# Patient Record
Sex: MALE | Race: BLACK OR AFRICAN AMERICAN | URBAN - METROPOLITAN AREA
[De-identification: names, ages, dates, MRNs, and addresses within clinical notes are randomized per-mention and may not be internally consistent; named-entity substitution may affect disease eponyms.]

---

## 2019-11-19 ENCOUNTER — TELEPHONE (OUTPATIENT)
Dept: INTERNAL MEDICINE CLINIC | Age: 34
End: 2019-11-19

## 2023-02-21 ENCOUNTER — APPOINTMENT (OUTPATIENT)
Dept: ULTRASOUND IMAGING | Age: 38
End: 2023-02-21
Attending: STUDENT IN AN ORGANIZED HEALTH CARE EDUCATION/TRAINING PROGRAM
Payer: MEDICAID

## 2023-02-21 ENCOUNTER — APPOINTMENT (OUTPATIENT)
Dept: CT IMAGING | Age: 38
End: 2023-02-21
Attending: STUDENT IN AN ORGANIZED HEALTH CARE EDUCATION/TRAINING PROGRAM
Payer: MEDICAID

## 2023-02-21 ENCOUNTER — HOSPITAL ENCOUNTER (EMERGENCY)
Age: 38
Discharge: HOME | End: 2023-02-21
Attending: STUDENT IN AN ORGANIZED HEALTH CARE EDUCATION/TRAINING PROGRAM
Payer: MEDICAID

## 2023-02-21 VITALS
HEIGHT: 69 IN | OXYGEN SATURATION: 97 % | HEART RATE: 102 BPM | DIASTOLIC BLOOD PRESSURE: 118 MMHG | TEMPERATURE: 98.1 F | SYSTOLIC BLOOD PRESSURE: 160 MMHG | BODY MASS INDEX: 45.32 KG/M2 | WEIGHT: 306 LBS | RESPIRATION RATE: 18 BRPM

## 2023-02-21 DIAGNOSIS — K59.00 CONSTIPATION, UNSPECIFIED CONSTIPATION TYPE: ICD-10-CM

## 2023-02-21 DIAGNOSIS — R10.11 ABDOMINAL PAIN, RIGHT UPPER QUADRANT: Primary | ICD-10-CM

## 2023-02-21 LAB
ALBUMIN SERPL-MCNC: 2.9 G/DL (ref 3.5–5)
ALBUMIN/GLOB SERPL: 0.7 (ref 1.1–2.2)
ALP SERPL-CCNC: 86 U/L (ref 45–117)
ALT SERPL-CCNC: 19 U/L (ref 12–78)
ANION GAP SERPL CALC-SCNC: 10 MMOL/L (ref 5–15)
AST SERPL-CCNC: 20 U/L (ref 15–37)
BASOPHILS # BLD: 0.1 K/UL (ref 0–0.1)
BASOPHILS NFR BLD: 1 % (ref 0–1)
BILIRUB SERPL-MCNC: 0.4 MG/DL (ref 0.2–1)
BUN SERPL-MCNC: 24 MG/DL (ref 6–20)
BUN/CREAT SERPL: 10 (ref 12–20)
CALCIUM SERPL-MCNC: 9 MG/DL (ref 8.5–10.1)
CHLORIDE SERPL-SCNC: 101 MMOL/L (ref 97–108)
CO2 SERPL-SCNC: 26 MMOL/L (ref 21–32)
CREAT SERPL-MCNC: 2.4 MG/DL (ref 0.7–1.3)
DIFFERENTIAL METHOD BLD: NORMAL
EOSINOPHIL # BLD: 0.3 K/UL (ref 0–0.4)
EOSINOPHIL NFR BLD: 3 % (ref 0–7)
ERYTHROCYTE [DISTWIDTH] IN BLOOD BY AUTOMATED COUNT: 12.6 % (ref 11.5–14.5)
GLOBULIN SER CALC-MCNC: 4.3 G/DL (ref 2–4)
GLUCOSE SERPL-MCNC: 181 MG/DL (ref 65–100)
HCT VFR BLD AUTO: 39.7 % (ref 36.6–50.3)
HGB BLD-MCNC: 13.6 G/DL (ref 12.1–17)
IMM GRANULOCYTES # BLD AUTO: 0 K/UL (ref 0–0.04)
IMM GRANULOCYTES NFR BLD AUTO: 0 % (ref 0–0.5)
INR PPP: 1 (ref 0.9–1.1)
LIPASE SERPL-CCNC: 115 U/L (ref 73–393)
LYMPHOCYTES # BLD: 2.4 K/UL (ref 0.8–3.5)
LYMPHOCYTES NFR BLD: 31 % (ref 12–49)
MCH RBC QN AUTO: 29.8 PG (ref 26–34)
MCHC RBC AUTO-ENTMCNC: 34.3 G/DL (ref 30–36.5)
MCV RBC AUTO: 86.9 FL (ref 80–99)
MONOCYTES # BLD: 0.6 K/UL (ref 0–1)
MONOCYTES NFR BLD: 8 % (ref 5–13)
NEUTS SEG # BLD: 4.5 K/UL (ref 1.8–8)
NEUTS SEG NFR BLD: 57 % (ref 32–75)
NRBC # BLD: 0 K/UL (ref 0–0.01)
NRBC BLD-RTO: 0 PER 100 WBC
PLATELET # BLD AUTO: 309 K/UL (ref 150–400)
PMV BLD AUTO: 11.1 FL (ref 8.9–12.9)
POTASSIUM SERPL-SCNC: 4.4 MMOL/L (ref 3.5–5.1)
PROT SERPL-MCNC: 7.2 G/DL (ref 6.4–8.2)
PROTHROMBIN TIME: 9.9 SEC (ref 9–11.1)
RBC # BLD AUTO: 4.57 M/UL (ref 4.1–5.7)
SODIUM SERPL-SCNC: 137 MMOL/L (ref 136–145)
TROPONIN I SERPL HS-MCNC: 26 NG/L (ref 0–76)
WBC # BLD AUTO: 7.8 K/UL (ref 4.1–11.1)

## 2023-02-21 PROCEDURE — 74011250636 HC RX REV CODE- 250/636: Performed by: STUDENT IN AN ORGANIZED HEALTH CARE EDUCATION/TRAINING PROGRAM

## 2023-02-21 PROCEDURE — 93005 ELECTROCARDIOGRAM TRACING: CPT

## 2023-02-21 PROCEDURE — 84484 ASSAY OF TROPONIN QUANT: CPT

## 2023-02-21 PROCEDURE — 85610 PROTHROMBIN TIME: CPT

## 2023-02-21 PROCEDURE — 76705 ECHO EXAM OF ABDOMEN: CPT

## 2023-02-21 PROCEDURE — 74011250637 HC RX REV CODE- 250/637: Performed by: STUDENT IN AN ORGANIZED HEALTH CARE EDUCATION/TRAINING PROGRAM

## 2023-02-21 PROCEDURE — 85025 COMPLETE CBC W/AUTO DIFF WBC: CPT

## 2023-02-21 PROCEDURE — 83690 ASSAY OF LIPASE: CPT

## 2023-02-21 PROCEDURE — 74176 CT ABD & PELVIS W/O CONTRAST: CPT

## 2023-02-21 PROCEDURE — 96361 HYDRATE IV INFUSION ADD-ON: CPT

## 2023-02-21 PROCEDURE — 36415 COLL VENOUS BLD VENIPUNCTURE: CPT

## 2023-02-21 PROCEDURE — 96374 THER/PROPH/DIAG INJ IV PUSH: CPT

## 2023-02-21 PROCEDURE — 99284 EMERGENCY DEPT VISIT MOD MDM: CPT

## 2023-02-21 PROCEDURE — 80053 COMPREHEN METABOLIC PANEL: CPT

## 2023-02-21 RX ORDER — ONDANSETRON 4 MG/1
4 TABLET, FILM COATED ORAL
Qty: 20 TABLET | Refills: 0 | Status: SHIPPED | OUTPATIENT
Start: 2023-02-21

## 2023-02-21 RX ORDER — POLYETHYLENE GLYCOL 3350 17 G/17G
17 POWDER, FOR SOLUTION ORAL DAILY
Qty: 116 G | Refills: 0 | Status: SHIPPED | OUTPATIENT
Start: 2023-02-21 | End: 2023-02-28

## 2023-02-21 RX ORDER — ONDANSETRON 2 MG/ML
4 INJECTION INTRAMUSCULAR; INTRAVENOUS
Status: COMPLETED | OUTPATIENT
Start: 2023-02-21 | End: 2023-02-21

## 2023-02-21 RX ORDER — ACETAMINOPHEN 325 MG/1
975 TABLET ORAL
Status: COMPLETED | OUTPATIENT
Start: 2023-02-21 | End: 2023-02-21

## 2023-02-21 RX ADMIN — SODIUM CHLORIDE 1000 ML: 9 INJECTION, SOLUTION INTRAVENOUS at 16:23

## 2023-02-21 RX ADMIN — ACETAMINOPHEN 975 MG: 325 TABLET ORAL at 16:39

## 2023-02-21 RX ADMIN — ONDANSETRON 4 MG: 2 INJECTION INTRAMUSCULAR; INTRAVENOUS at 16:24

## 2023-02-21 NOTE — ED PROVIDER NOTES
EMERGENCY DEPARTMENT HISTORY AND PHYSICAL EXAM      Date: 2/21/2023  Patient Name: Gela Ramos    History of Presenting Illness     Chief Complaint   Patient presents with    Constipation     History Provided By: Patient    HPI: Gela Ramos, 40 y.o. male with a past medical history significant for medical problems as stated below presents to the ED with cc of abdominal pain over the past 3 days. He reports that his symptoms have been persistent. He hurts in the right side of his lower back as well as the right side of his abdomen. He has had associated nausea and nonbloody, nonbilious vomiting. He feels that this is worse when he is trying to have a bowel movement. He has been constipated for the past 3 days and has had minimal bowel function. No history of intra-abdominal surgeries. Denies dysuria/hematuria and no hx  of kidney stones. Has not had much to drink today. Reports he was recently told that he had kidney dysfunction. PCP: None    No current facility-administered medications on file prior to encounter. No current outpatient medications on file prior to encounter. Past History     Past Medical History:  No past medical history on file. DM  HTN    Past Surgical History:  No past surgical history on file. Family History:  No family history on file. Social History:        Allergies:  No Known Allergies      Review of Systems   Review of Systems  Per HPI    Physical Exam   Physical Exam  Vital signs reviewed and documented in EMR  Nontoxic and well-appearing, no acute distress  Dry mucous membranes  Mild  tachycardia  Breathing symmetric, no acc muscle use  Abdomen nondistended, obese, soft, diastasis noted, right sided pain, RUQ>RLQ; no r/g  Right lower back pain, no CVA tenderness, no midline tenderness  No focal motor deficits  No skin rash    Diagnostic Study Results     Labs -     Recent Results (from the past 24 hour(s))   EKG, 12 LEAD, INITIAL    Collection Time: 02/21/23 3:27 PM   Result Value Ref Range    Ventricular Rate 102 BPM    Atrial Rate 102 BPM    P-R Interval 146 ms    QRS Duration 88 ms    Q-T Interval 340 ms    QTC Calculation (Bezet) 443 ms    Calculated P Axis 53 degrees    Calculated R Axis 46 degrees    Calculated T Axis -26 degrees    Diagnosis       Sinus tachycardia  T wave abnormality, consider inferior ischemia  No previous ECGs available     CBC WITH AUTOMATED DIFF    Collection Time: 02/21/23  4:22 PM   Result Value Ref Range    WBC 7.8 4.1 - 11.1 K/uL    RBC 4.57 4.10 - 5.70 M/uL    HGB 13.6 12.1 - 17.0 g/dL    HCT 39.7 36.6 - 50.3 %    MCV 86.9 80.0 - 99.0 FL    MCH 29.8 26.0 - 34.0 PG    MCHC 34.3 30.0 - 36.5 g/dL    RDW 12.6 11.5 - 14.5 %    PLATELET 095 349 - 627 K/uL    MPV 11.1 8.9 - 12.9 FL    NRBC 0.0 0  WBC    ABSOLUTE NRBC 0.00 0.00 - 0.01 K/uL    NEUTROPHILS 57 32 - 75 %    LYMPHOCYTES 31 12 - 49 %    MONOCYTES 8 5 - 13 %    EOSINOPHILS 3 0 - 7 %    BASOPHILS 1 0 - 1 %    IMMATURE GRANULOCYTES 0 0.0 - 0.5 %    ABS. NEUTROPHILS 4.5 1.8 - 8.0 K/UL    ABS. LYMPHOCYTES 2.4 0.8 - 3.5 K/UL    ABS. MONOCYTES 0.6 0.0 - 1.0 K/UL    ABS. EOSINOPHILS 0.3 0.0 - 0.4 K/UL    ABS. BASOPHILS 0.1 0.0 - 0.1 K/UL    ABS. IMM. GRANS. 0.0 0.00 - 0.04 K/UL    DF AUTOMATED     METABOLIC PANEL, COMPREHENSIVE    Collection Time: 02/21/23  4:22 PM   Result Value Ref Range    Sodium 137 136 - 145 mmol/L    Potassium 4.4 3.5 - 5.1 mmol/L    Chloride 101 97 - 108 mmol/L    CO2 26 21 - 32 mmol/L    Anion gap 10 5 - 15 mmol/L    Glucose 181 (H) 65 - 100 mg/dL    BUN 24 (H) 6 - 20 MG/DL    Creatinine 2.40 (H) 0.70 - 1.30 MG/DL    BUN/Creatinine ratio 10 (L) 12 - 20      eGFR 35 (L) >60 ml/min/1.73m2    Calcium 9.0 8.5 - 10.1 MG/DL    Bilirubin, total 0.4 0.2 - 1.0 MG/DL    ALT (SGPT) 19 12 - 78 U/L    AST (SGOT) 20 15 - 37 U/L    Alk.  phosphatase 86 45 - 117 U/L    Protein, total 7.2 6.4 - 8.2 g/dL    Albumin 2.9 (L) 3.5 - 5.0 g/dL    Globulin 4.3 (H) 2.0 - 4.0 g/dL    A-G Ratio 0.7 (L) 1.1 - 2.2     LIPASE    Collection Time: 02/21/23  4:22 PM   Result Value Ref Range    Lipase 115 73 - 393 U/L   PROTHROMBIN TIME + INR    Collection Time: 02/21/23  4:22 PM   Result Value Ref Range    INR 1.0 0.9 - 1.1      Prothrombin time 9.9 9.0 - 11.1 sec   TROPONIN-HIGH SENSITIVITY    Collection Time: 02/21/23  4:22 PM   Result Value Ref Range    Troponin-High Sensitivity 26 0 - 76 ng/L       Radiologic Studies -   CT ABD PELV WO CONT   Final Result   No acute abdominal or pelvic abnormality is detected. Diverticulosis without   diverticulitis. US ABD LTD   Final Result   Increased hepatic parenchymal echotexture compatible with hepatic   steatosis. CT Results  (Last 48 hours)                 02/21/23 1553  CT ABD PELV WO CONT Final result    Impression:  No acute abdominal or pelvic abnormality is detected. Diverticulosis without   diverticulitis. Narrative:  EXAM: CT ABD PELV WO CONT       INDICATION: right-sided abdominal pain       COMPARISON: None       IV CONTRAST: None. ORAL CONTRAST: None       TECHNIQUE:    Thin axial images were obtained through the abdomen and pelvis. Coronal and   sagittal reformats were generated. CT dose reduction was achieved through use of   a standardized protocol tailored for this examination and automatic exposure   control for dose modulation. The absence of intravenous contrast material reduces the sensitivity for   evaluation of the vasculature and solid organs. FINDINGS:    LOWER THORAX: No significant abnormality in the incidentally imaged lower chest.   LIVER: No mass. BILIARY TREE: Gallbladder is within normal limits. CBD is not dilated. SPLEEN: within normal limits. PANCREAS: No focal abnormality. ADRENALS: Unremarkable. KIDNEYS/URETERS: No calculus or hydronephrosis. STOMACH: Unremarkable. SMALL BOWEL: No dilatation or wall thickening. COLON: No dilatation or wall thickening.  Diverticula without associated acute   inflammatory change. APPENDIX: Normal   PERITONEUM: No ascites or pneumoperitoneum. RETROPERITONEUM: No lymphadenopathy or aortic aneurysm. REPRODUCTIVE ORGANS: Unremarkable for age. URINARY BLADDER: No mass or calculus. BONES: No destructive bone lesion. ABDOMINAL WALL: No mass or hernia. ADDITIONAL COMMENTS: N/A                 CXR Results  (Last 48 hours)      None          Medical Decision Making   I am the first provider for this patient. I reviewed the vital signs, available nursing notes, past medical history, past surgical history, family history and social history. Vital Signs-Reviewed the patient's vital signs. Patient Vitals for the past 12 hrs:   Temp Pulse Resp BP SpO2   02/21/23 1800 -- (!) 102 18 (!) 160/118 97 %   02/21/23 1725 -- (!) 103 18 (!) 173/93 97 %   02/21/23 1458 98.1 °F (36.7 °C) (!) 110 20 (!) 170/102 98 %     Records Reviewed: Nursing records and medical records reviewed    Medical Decision Making  Patient is nontoxic 80-year-old male presents the emergency department 3 days of right-sided abdominal pain. He is obese and having nausea and vomiting. Has focal right upper quadrant tenderness on exam.  Concerned about the possibility of biliary pathology. Will order right upper quadrant ultrasound to evaluate further. We will also obtain CT scan without contrast with reported history of kidney dysfunction to evaluate for other intra-abdominal pathology. Lab testing with CBC, CMP, lipase, troponin, INR, and UA. Will treat symptoms with IVF and PO tylenol. Doubt systemic infection. Constipation is possible but will investigate other possible causes as listed above. Also possible that symptoms related to a viral syndrome. Amount and/or Complexity of Data Reviewed  External Data Reviewed: labs. Details: Reviewed OSH labwork  Labs: ordered. Radiology: ordered and independent interpretation performed.  Decision-making details documented in ED Course. ECG/medicine tests: ordered and independent interpretation performed. Decision-making details documented in ED Course. Risk  OTC drugs. Prescription drug management. ED Course:   Initial assessment performed. The patients presenting problems have been discussed, and they are in agreement with the care plan formulated and outlined with them. I have encouraged them to ask questions as they arise throughout their visit. ED Course as of 02/21/23 2018 Tue Feb 21, 2023   1530 EKG is interpreted by me with sinus rhythm, heart rate 102, normal axis, normal intervals, T wave inversions in inferior leads, no previous EKG for comparison. [WB]   1724 Ultrasound with findings of possible hepatic steatosis. No gallbladder wall thickening or pericholecystic fluid. No cholelithiasis. CT as interpreted by the radiologist with no acute abdominal or pelvic abnormality. Radiology did note diverticulosis without diverticulitis. On my review of imaging he does have moderately increased stool burden which may be causing his symptoms overall. Blood work does show elevated creatinine to 2.4 with GFR 35 - similar to labs seen at OSH on 1/2/23. This is consistent with stage IIIb chronic kidney disease. [WB]   0055 Discussed work-up findings with the patient. His tachycardia has improved. He reports that his symptoms have resolved. Discussed his findings of constipation. We will plan on prescription for PO Miralax as well as Zofran. Discussed customary return precautions. [WB]      ED Course User Index  [WB] Kavon Godinez MD     Critical Care:  None    Disposition:  Home    DISCHARGE PLAN:  1. Discharge Medication List as of 2/21/2023  6:02 PM        START taking these medications    Details   polyethylene glycol (Miralax) 17 gram/dose powder Take 17 g by mouth daily for 7 days.  1 tablespoon with 8 oz of water daily, Normal, Disp-116 g, R-0      ondansetron hcl (Zofran) 4 mg tablet Take 1 Tablet by mouth every eight (8) hours as needed for Nausea or Vomiting., Normal, Disp-20 Tablet, R-0           2. Follow-up Information       Follow up With Specialties Details Why [facility] EMERGENCY DEPT Emergency Medicine  As needed, If symptoms worsen [address]          3. Return to ED if worse     Diagnosis     Clinical Impression:   1. Abdominal pain, right upper quadrant    2. Constipation, unspecified constipation type      Attestations:    Yesenia Ayoub MD    Please note that this dictation was completed with [employer], the computer voice recognition software. Quite often unanticipated grammatical, syntax, homophones, and other interpretive errors are inadvertently transcribed by the computer software. Please disregard these errors. Please excuse any errors that have escaped final proofreading. Thank you.

## 2023-02-21 NOTE — Clinical Note
[facility] EMERGENCY DEPT  [address] [facility] [id number] [phone]    Work/School Note    Date: 2/21/2023    To Whom It May concern:      Steven Melgoza was seen and treated today in the emergency room by the following provider(s):  Attending Provider: Valente Oppenheim, MD.      Steven Melgoza is excused from work/school on 02/21/23. He is clear to return to work/school on 02/22/23.         Sincerely,          April Novoa MD

## 2023-02-21 NOTE — ED NOTES
Discharge instructions were given to the patient by Ortega Hendrix RN. The patient left the Emergency Department ambulatory, alert and oriented and in no acute distress with 2 prescriptions. The patient was encouraged to call or return to the ED for worsening issues or problems and was encouraged to schedule a follow up appointment for continuing care. The patient verbalized understanding of discharge instructions and prescriptions, all questions were answered. The patient has no further concerns at this time.

## 2023-02-21 NOTE — ED NOTES
Pt presents to ED ambulatory complaining of abd pain, constipation, and back pain x 3 days. Pt noted to be tender to right abdomen. Pt denies V/D but is reporting nausea. Pt is alert and oriented x 4, RR even and unlabored, skin is warm and dry. Assessment completed and pt updated on plan of care. Call bell in reach. Emergency Department Nursing Plan of Care       The Nursing Plan of Care is developed from the Nursing assessment and Emergency Department Attending provider initial evaluation. The plan of care may be reviewed in the ED Provider note.     The Plan of Care was developed with the following considerations:   Patient / Family readiness to learn indicated by:verbalized understanding  Persons(s) to be included in education: patient  Barriers to Learning/Limitations:No    Signed     Lawrence Davis    2/21/2023   3:17 PM

## 2023-02-22 LAB
ATRIAL RATE: 102 BPM
CALCULATED P AXIS, ECG09: 53 DEGREES
CALCULATED R AXIS, ECG10: 46 DEGREES
CALCULATED T AXIS, ECG11: -26 DEGREES
DIAGNOSIS, 93000: NORMAL
P-R INTERVAL, ECG05: 146 MS
Q-T INTERVAL, ECG07: 340 MS
QRS DURATION, ECG06: 88 MS
QTC CALCULATION (BEZET), ECG08: 443 MS
VENTRICULAR RATE, ECG03: 102 BPM

## 2023-06-02 NOTE — TELEPHONE ENCOUNTER
Antony, [address] Office Pool             General Message/Vendor Calls     Caller's first and last name:   Josee Hylton with [facility] [address] [facility] in [address] [place] [address]       Reason for call: Leticia Mcguire is waiting for NP est PCP appt. She called last week and hasn't gotten a return call yet.  Pt is due to discharge on 11/26/19 Tuesday.         Callback required yes/no and why:  yes       Best contact number(s): [phone]       Details to clarify the request:       Hafsa Ricketts     Copy/Paste   ENVERA
Attempted to return Clari's call. Notified via voicemail to call other locations for sooner NP appointments.
chest pain

## 2023-06-28 ENCOUNTER — HOSPITAL ENCOUNTER (EMERGENCY)
Facility: HOSPITAL | Age: 38
Discharge: HOME | End: 2023-06-28
Attending: EMERGENCY MEDICINE
Payer: MEDICARE

## 2023-06-28 ENCOUNTER — APPOINTMENT (OUTPATIENT)
Facility: HOSPITAL | Age: 38
End: 2023-06-28
Payer: MEDICARE

## 2023-06-28 VITALS
HEIGHT: 69 IN | RESPIRATION RATE: 16 BRPM | WEIGHT: 280 LBS | OXYGEN SATURATION: 99 % | DIASTOLIC BLOOD PRESSURE: 111 MMHG | SYSTOLIC BLOOD PRESSURE: 173 MMHG | HEART RATE: 97 BPM | BODY MASS INDEX: 41.47 KG/M2 | TEMPERATURE: 99 F

## 2023-06-28 DIAGNOSIS — I16.0 HYPERTENSIVE URGENCY: ICD-10-CM

## 2023-06-28 DIAGNOSIS — R22.0 SWELLING OF RIGHT SIDE OF FACE: Primary | ICD-10-CM

## 2023-06-28 DIAGNOSIS — L03.211 CELLULITIS OF FACE: ICD-10-CM

## 2023-06-28 PROCEDURE — 6370000000 HC RX 637 (ALT 250 FOR IP): Performed by: EMERGENCY MEDICINE

## 2023-06-28 PROCEDURE — 99284 EMERGENCY DEPT VISIT MOD MDM: CPT

## 2023-06-28 PROCEDURE — 70486 CT MAXILLOFACIAL W/O DYE: CPT

## 2023-06-28 RX ORDER — DIPHENHYDRAMINE HCL 25 MG
50 TABLET ORAL EVERY 6 HOURS PRN
Qty: 30 TABLET | Refills: 0 | Status: SHIPPED | OUTPATIENT
Start: 2023-06-28 | End: 2023-07-28

## 2023-06-28 RX ORDER — AMOXICILLIN AND CLAVULANATE POTASSIUM 875; 125 MG/1; MG/1
1 TABLET, FILM COATED ORAL
Status: COMPLETED | OUTPATIENT
Start: 2023-06-28 | End: 2023-06-28

## 2023-06-28 RX ORDER — OXYCODONE HYDROCHLORIDE AND ACETAMINOPHEN 5; 325 MG/1; MG/1
2 TABLET ORAL
Status: COMPLETED | OUTPATIENT
Start: 2023-06-28 | End: 2023-06-28

## 2023-06-28 RX ORDER — OXYCODONE HYDROCHLORIDE AND ACETAMINOPHEN 5; 325 MG/1; MG/1
1 TABLET ORAL EVERY 6 HOURS PRN
Qty: 12 TABLET | Refills: 0 | Status: SHIPPED | OUTPATIENT
Start: 2023-06-28 | End: 2023-07-01

## 2023-06-28 RX ORDER — DIPHENHYDRAMINE HCL 25 MG
50 CAPSULE ORAL
Status: COMPLETED | OUTPATIENT
Start: 2023-06-28 | End: 2023-06-28

## 2023-06-28 RX ORDER — AMOXICILLIN AND CLAVULANATE POTASSIUM 875; 125 MG/1; MG/1
1 TABLET, FILM COATED ORAL 2 TIMES DAILY
Qty: 14 TABLET | Refills: 0 | Status: SHIPPED | OUTPATIENT
Start: 2023-06-28 | End: 2023-07-05

## 2023-06-28 RX ADMIN — DIPHENHYDRAMINE HYDROCHLORIDE 50 MG: 25 CAPSULE ORAL at 06:10

## 2023-06-28 RX ADMIN — AMOXICILLIN AND CLAVULANATE POTASSIUM 1 TABLET: 875; 125 TABLET, FILM COATED ORAL at 06:10

## 2023-06-28 RX ADMIN — DIPHENHYDRAMINE HYDROCHLORIDE: 12.5 LIQUID ORAL at 06:11

## 2023-06-28 RX ADMIN — OXYCODONE HYDROCHLORIDE AND ACETAMINOPHEN 2 TABLET: 5; 325 TABLET ORAL at 06:10

## 2023-06-28 ASSESSMENT — PAIN DESCRIPTION - DESCRIPTORS
DESCRIPTORS: ACHING
DESCRIPTORS: GNAWING

## 2023-06-28 ASSESSMENT — PAIN DESCRIPTION - ORIENTATION
ORIENTATION: RIGHT
ORIENTATION: RIGHT

## 2023-06-28 ASSESSMENT — ENCOUNTER SYMPTOMS
RHINORRHEA: 0
ABDOMINAL PAIN: 0
SORE THROAT: 0
VOMITING: 0
SHORTNESS OF BREATH: 0
COUGH: 0
NAUSEA: 0
EYE PAIN: 0
DIARRHEA: 0
FACIAL SWELLING: 1

## 2023-06-28 ASSESSMENT — PAIN SCALES - GENERAL
PAINLEVEL_OUTOF10: 10
PAINLEVEL_OUTOF10: 10

## 2023-06-28 ASSESSMENT — PAIN DESCRIPTION - LOCATION
LOCATION: FACE
LOCATION: FACE

## 2023-06-28 ASSESSMENT — LIFESTYLE VARIABLES
HOW MANY STANDARD DRINKS CONTAINING ALCOHOL DO YOU HAVE ON A TYPICAL DAY: PATIENT DOES NOT DRINK
HOW OFTEN DO YOU HAVE A DRINK CONTAINING ALCOHOL: NEVER

## 2023-06-28 ASSESSMENT — PAIN - FUNCTIONAL ASSESSMENT
PAIN_FUNCTIONAL_ASSESSMENT: 0-10
PAIN_FUNCTIONAL_ASSESSMENT: ACTIVITIES ARE NOT PREVENTED

## 2025-04-25 ENCOUNTER — HOSPITAL ENCOUNTER (OUTPATIENT)
Facility: HOSPITAL | Age: 40
Setting detail: SPECIMEN
Discharge: HOME | End: 2025-04-28

## 2025-04-25 PROCEDURE — 84132 ASSAY OF SERUM POTASSIUM: CPT

## 2025-04-26 LAB — POTASSIUM SERPL-SCNC: 5.4 MMOL/L (ref 3.5–5.1)
